# Patient Record
Sex: MALE | Race: BLACK OR AFRICAN AMERICAN | Employment: UNEMPLOYED | URBAN - METROPOLITAN AREA
[De-identification: names, ages, dates, MRNs, and addresses within clinical notes are randomized per-mention and may not be internally consistent; named-entity substitution may affect disease eponyms.]

---

## 2019-05-07 ENCOUNTER — HOSPITAL ENCOUNTER (EMERGENCY)
Facility: HOSPITAL | Age: 34
Discharge: HOME | End: 2019-05-07
Attending: EMERGENCY MEDICINE

## 2019-05-07 VITALS
RESPIRATION RATE: 16 BRPM | DIASTOLIC BLOOD PRESSURE: 66 MMHG | SYSTOLIC BLOOD PRESSURE: 102 MMHG | OXYGEN SATURATION: 97 % | TEMPERATURE: 99 F | BODY MASS INDEX: 29.35 KG/M2 | HEIGHT: 67 IN | HEART RATE: 70 BPM | WEIGHT: 187 LBS

## 2019-05-07 DIAGNOSIS — Z59.00 HOMELESS: ICD-10-CM

## 2019-05-07 DIAGNOSIS — R11.0 NAUSEA: Primary | ICD-10-CM

## 2019-05-07 PROCEDURE — 99283 EMERGENCY DEPT VISIT LOW MDM: CPT

## 2019-05-07 RX ORDER — ONDANSETRON 4 MG/1
4 TABLET, ORALLY DISINTEGRATING ORAL EVERY 4 HOURS PRN
Qty: 10 TABLET | Refills: 0 | Status: SHIPPED | OUTPATIENT
Start: 2019-05-07 | End: 2019-05-14

## 2019-05-07 SDOH — ECONOMIC STABILITY - HOUSING INSECURITY: HOMELESSNESS UNSPECIFIED: Z59.00

## 2019-05-07 NOTE — CM/SW NOTE
Cab voucher authorized for patient to be transported to [facility] in [place]. Patient requested that we assist him to [place] to [facility] so he can stay there tonight. RN updated.

## 2019-05-07 NOTE — ED PROVIDER NOTES
Patient Seen in: [facility] AND [facility] Emergency Department    History   Patient presents with:  Nausea/Vomiting/Diarrhea (gastrointestinal)    Stated Complaint: N/V abdominal pain    HPI    65-year-old homeless man with  history of schizophrenia and bipolar kg   SpO2 98%   BMI 29.29 kg/m²         Physical Exam   Constitutional: He is oriented to person, place, and time. He appears well-nourished. No distress. Unkempt   HENT:   Head: Normocephalic and atraumatic.    Mouth/Throat: Oropharynx is clear and moist discharge summaries, testing, and procedures, and reviewed those reports. Complicating Factors: The patient already has does not have a problem list on file. to contribute to the complexity of his ED evaluation.     - pt comfortable with d/c at this time

## 2019-05-07 NOTE — CM/SW NOTE
Called by Gabrielle Wells to assist with discharging patient as patient is homeless.   Spoke with patient he would like a cab voucher to the [address] - patient aware shelter does not open until 1900 tomorrow evening on 5/7 - informed adam

## 2019-05-07 NOTE — ED INITIAL ASSESSMENT (HPI)
Pt brought in by EMS from Parking lot for complaints of Nausea, vomiting and abdominal pain that started yesterday, pt has history of Bipolar and schizophrenia. pt homeless.

## (undated) NOTE — ED AVS SNAPSHOT
Roberto Shaffer   MRN: [id number]    Department:  [facility] Emergency Department   Date of Visit:  5/7/2019           Disclosure     Insurance plans vary and the physician(s) referred by the ER may not be covered by your plan.  Please contact CARE PHYSICIAN AT ONCE OR RETURN IMMEDIATELY TO THE EMERGENCY DEPARTMENT. If you have been prescribed any medication(s), please fill your prescription right away and begin taking the medication(s) as directed.   If you believe that any of the medications